# Patient Record
Sex: FEMALE | Race: WHITE | NOT HISPANIC OR LATINO | Employment: OTHER | URBAN - METROPOLITAN AREA
[De-identification: names, ages, dates, MRNs, and addresses within clinical notes are randomized per-mention and may not be internally consistent; named-entity substitution may affect disease eponyms.]

---

## 2020-08-26 ENCOUNTER — SURGERY/PROCEDURE (OUTPATIENT)
Dept: URBAN - METROPOLITAN AREA SURGERY 14 | Facility: SURGERY | Age: 75
End: 2020-08-26

## 2020-08-26 ENCOUNTER — ESTABLISHED PATIENT (OUTPATIENT)
Dept: URBAN - METROPOLITAN AREA CLINIC 39 | Facility: CLINIC | Age: 75
End: 2020-08-26

## 2020-08-26 DIAGNOSIS — H26.492: ICD-10-CM

## 2020-08-26 DIAGNOSIS — H35.30: ICD-10-CM

## 2020-08-26 PROCEDURE — 66821 AFTER CATARACT LASER SURGERY: CPT

## 2020-08-26 PROCEDURE — 92004 COMPRE OPH EXAM NEW PT 1/>: CPT

## 2020-08-26 ASSESSMENT — TONOMETRY
OS_IOP_MMHG: 24
OD_IOP_MMHG: 21

## 2020-08-26 ASSESSMENT — VISUAL ACUITY
OD_SC: 20/60-2
OS_BAT: 20/80

## 2023-03-24 ENCOUNTER — OFFICE VISIT (OUTPATIENT)
Dept: FAMILY MEDICINE CLINIC | Age: 78
End: 2023-03-24
Payer: MEDICARE

## 2023-03-24 VITALS
HEIGHT: 65 IN | OXYGEN SATURATION: 97 % | WEIGHT: 107 LBS | DIASTOLIC BLOOD PRESSURE: 84 MMHG | HEART RATE: 78 BPM | RESPIRATION RATE: 14 BRPM | SYSTOLIC BLOOD PRESSURE: 132 MMHG | TEMPERATURE: 98.2 F | BODY MASS INDEX: 17.83 KG/M2

## 2023-03-24 DIAGNOSIS — H61.21 IMPACTED CERUMEN OF RIGHT EAR: ICD-10-CM

## 2023-03-24 DIAGNOSIS — H66.002 NON-RECURRENT ACUTE SUPPURATIVE OTITIS MEDIA OF LEFT EAR WITHOUT SPONTANEOUS RUPTURE OF TYMPANIC MEMBRANE: Primary | ICD-10-CM

## 2023-03-24 DIAGNOSIS — R21 RASH AND NONSPECIFIC SKIN ERUPTION: ICD-10-CM

## 2023-03-24 PROBLEM — I10 PRIMARY HYPERTENSION: Status: ACTIVE | Noted: 2023-03-24

## 2023-03-24 PROCEDURE — 3079F DIAST BP 80-89 MM HG: CPT

## 2023-03-24 PROCEDURE — 3075F SYST BP GE 130 - 139MM HG: CPT

## 2023-03-24 PROCEDURE — 99204 OFFICE O/P NEW MOD 45 MIN: CPT

## 2023-03-24 PROCEDURE — 1123F ACP DISCUSS/DSCN MKR DOCD: CPT

## 2023-03-24 RX ORDER — AMOXICILLIN AND CLAVULANATE POTASSIUM 875; 125 MG/1; MG/1
1 TABLET, FILM COATED ORAL 2 TIMES DAILY
Qty: 20 TABLET | Refills: 0 | Status: SHIPPED | OUTPATIENT
Start: 2023-03-24 | End: 2023-04-03

## 2023-03-24 RX ORDER — CETIRIZINE HYDROCHLORIDE 5 MG/1
5 TABLET ORAL DAILY
Qty: 30 TABLET | Refills: 0 | Status: SHIPPED | OUTPATIENT
Start: 2023-03-24

## 2023-03-24 RX ORDER — METHYLPREDNISOLONE 4 MG/1
TABLET ORAL
Qty: 1 KIT | Refills: 0 | Status: CANCELLED | OUTPATIENT
Start: 2023-03-24 | End: 2023-03-30

## 2023-03-24 RX ORDER — ATENOLOL 25 MG/1
TABLET ORAL
COMMUNITY
Start: 2023-02-26

## 2023-03-24 RX ORDER — VALSARTAN AND HYDROCHLOROTHIAZIDE 320; 25 MG/1; MG/1
TABLET, FILM COATED ORAL
COMMUNITY
Start: 2023-02-02

## 2023-03-24 SDOH — ECONOMIC STABILITY: FOOD INSECURITY: WITHIN THE PAST 12 MONTHS, THE FOOD YOU BOUGHT JUST DIDN'T LAST AND YOU DIDN'T HAVE MONEY TO GET MORE.: NEVER TRUE

## 2023-03-24 SDOH — ECONOMIC STABILITY: FOOD INSECURITY: WITHIN THE PAST 12 MONTHS, YOU WORRIED THAT YOUR FOOD WOULD RUN OUT BEFORE YOU GOT MONEY TO BUY MORE.: NEVER TRUE

## 2023-03-24 SDOH — ECONOMIC STABILITY: HOUSING INSECURITY
IN THE LAST 12 MONTHS, WAS THERE A TIME WHEN YOU DID NOT HAVE A STEADY PLACE TO SLEEP OR SLEPT IN A SHELTER (INCLUDING NOW)?: NO

## 2023-03-24 SDOH — ECONOMIC STABILITY: INCOME INSECURITY: HOW HARD IS IT FOR YOU TO PAY FOR THE VERY BASICS LIKE FOOD, HOUSING, MEDICAL CARE, AND HEATING?: NOT HARD AT ALL

## 2023-03-24 ASSESSMENT — PATIENT HEALTH QUESTIONNAIRE - PHQ9
SUM OF ALL RESPONSES TO PHQ QUESTIONS 1-9: 0
SUM OF ALL RESPONSES TO PHQ9 QUESTIONS 1 & 2: 0
SUM OF ALL RESPONSES TO PHQ QUESTIONS 1-9: 0
2. FEELING DOWN, DEPRESSED OR HOPELESS: 0
1. LITTLE INTEREST OR PLEASURE IN DOING THINGS: 0

## 2023-03-24 ASSESSMENT — ENCOUNTER SYMPTOMS
SHORTNESS OF BREATH: 0
COUGH: 0
WHEEZING: 0
ROS SKIN COMMENTS: SEE HPI

## 2023-03-24 NOTE — PATIENT INSTRUCTIONS
Antibiotic Instructions: Complete the full course of antibiotics as ordered. Take each dose with a small snack or meal to lessen potential GI upset. To prevent antibiotic resistance, please take medication as ordered and for the full duration even if you start to feel better. Consider intake of yogurt or probiotic during antibiotic use and for a few days after to help reduce the risk of developing a secondary infection. Take the yogurt or probiotic at least 2 hours after taking the antibiotic.

## 2023-03-24 NOTE — PROGRESS NOTES
Occupational History    Not on file   Tobacco Use    Smoking status: Never     Passive exposure: Past    Smokeless tobacco: Former     Quit date: 2022   Vaping Use    Vaping Use: Never used   Substance and Sexual Activity    Alcohol use: Never    Drug use: Never    Sexual activity: Not on file   Other Topics Concern    Not on file   Social History Narrative    Not on file     Social Determinants of Health     Financial Resource Strain: Low Risk     Difficulty of Paying Living Expenses: Not hard at all   Food Insecurity: No Food Insecurity    Worried About [facility] in the Last Year: Never true    [address] [place] in the Last Year: Never true   Transportation Needs: Unknown    Lack of Transportation (Medical): Not on file    Lack of Transportation (Non-Medical): No   Physical Activity: Not on file   Stress: Not on file   Social Connections: Not on file   Intimate Partner Violence: Not on file   Housing Stability: Unknown    Unable to Pay for Housing in the Last Year: Not on file    Number of Places Lived in the Last Year: Not on file    Unstable Housing in the Last Year: No       History reviewed. No pertinent family history. Review of Systems   Constitutional:  Negative for chills, fatigue and fever. HENT:  Positive for ear pain. See HPI   Respiratory:  Negative for cough, shortness of breath and wheezing. Cardiovascular:  Negative for chest pain and palpitations. Musculoskeletal:  Negative for arthralgias and myalgias. Skin:  Positive for rash. See HPI   Allergic/Immunologic: Negative for environmental allergies and food allergies. OBJECTIVE:  VITALS:  /84 (Site: Right Upper Arm, Position: Sitting, Cuff Size: Medium Adult)   Pulse 78   Temp 98.2 °F (36.8 °C) (Temporal)   Resp 14   Ht 5' 5\" (1.651 m)   Wt 107 lb (48.5 kg)   SpO2 97%   BMI 17.81 kg/m²     Physical Exam  Vitals reviewed. Constitutional:       General: She is not in acute distress.

## 2023-09-14 ENCOUNTER — OFFICE VISIT (OUTPATIENT)
Dept: FAMILY MEDICINE CLINIC | Age: 78
End: 2023-09-14
Payer: MEDICARE

## 2023-09-14 VITALS
SYSTOLIC BLOOD PRESSURE: 138 MMHG | OXYGEN SATURATION: 97 % | DIASTOLIC BLOOD PRESSURE: 78 MMHG | HEART RATE: 65 BPM | WEIGHT: 108.6 LBS | HEIGHT: 65 IN | BODY MASS INDEX: 18.09 KG/M2

## 2023-09-14 DIAGNOSIS — S90.212A SUBUNGUAL HEMATOMA OF GREAT TOE OF LEFT FOOT, INITIAL ENCOUNTER: Primary | ICD-10-CM

## 2023-09-14 PROCEDURE — 3078F DIAST BP <80 MM HG: CPT | Performed by: NURSE PRACTITIONER

## 2023-09-14 PROCEDURE — 99203 OFFICE O/P NEW LOW 30 MIN: CPT | Performed by: NURSE PRACTITIONER

## 2023-09-14 PROCEDURE — 3075F SYST BP GE 130 - 139MM HG: CPT | Performed by: NURSE PRACTITIONER

## 2023-09-14 PROCEDURE — 1123F ACP DISCUSS/DSCN MKR DOCD: CPT | Performed by: NURSE PRACTITIONER

## 2023-09-14 RX ORDER — ALPRAZOLAM 0.5 MG/1
TABLET ORAL
COMMUNITY
Start: 2023-08-15

## 2023-09-14 RX ORDER — ASPIRIN 81 MG/1
81 TABLET ORAL DAILY
COMMUNITY
Start: 2023-06-02

## 2023-09-14 ASSESSMENT — ENCOUNTER SYMPTOMS
CHEST TIGHTNESS: 0
DIARRHEA: 0
NAUSEA: 0
COUGH: 0
VOMITING: 0
SHORTNESS OF BREATH: 0

## 2023-09-14 NOTE — PROGRESS NOTES
Subjective  Jerome Flynn, 78 y.o. female presents today with:  Chief Complaint   Patient presents with    Toe Injury     Onset- 3 weeks stubbed toe   She states went on a walk yesterday and noticed some blood. Location- LT big toe  ROM- well   Pain- Y  Bruised- Y  Swollen- N  Red- N   Warmth- N   Treatments- none        I have reviewed staff HPI/chief complaint do agree with above. Patient stubbed her toe on a brick step leading into her outdoor deck approximately 3 weeks ago. Has noticed since this time bruising underneath the left great toenail that has slightly spread into the left toe medially from the nail bed region. There is mild pain with wearing shoes otherwise denies any significant pain/discomfort with ambulation or while wearing open toed shoes. Denies any significant redness, warmth to the area. She has noticed spontaneous drainage/bleeding from the distal region of the great toenail. Denies any loss of range of motion. Denies any use of over-the-counter medications at this time          Review of Systems   Constitutional:  Negative for chills, fatigue and fever. Respiratory:  Negative for cough, chest tightness and shortness of breath. Cardiovascular:  Negative for chest pain, palpitations and leg swelling. Gastrointestinal:  Negative for diarrhea, nausea and vomiting. Musculoskeletal:  Negative for joint swelling. Bruising to left great toe. Skin:  Negative for rash. Neurological:  Negative for dizziness, weakness, light-headedness, numbness and headaches. Psychiatric/Behavioral:  Negative for behavioral problems.         Past Medical History:   Diagnosis Date    Lung cancer ([address]) 01/2022 03/2022 two tumors removed from lungs, stage 1, no complications since     Past Surgical History:   Procedure Laterality Date    CORONARY ARTERY BYPASS GRAFT  2017    EAR SURGERY Left 2020    repair of ruptured TM    SUBTOTAL COLECTOMY  2020     Social History     Socioeconomic

## 2023-10-01 ENCOUNTER — APPOINTMENT (OUTPATIENT)
Dept: CT IMAGING | Age: 78
DRG: 640 | End: 2023-10-01
Payer: MEDICARE

## 2023-10-01 ENCOUNTER — HOSPITAL ENCOUNTER (INPATIENT)
Age: 78
LOS: 1 days | Discharge: HOME | DRG: 640 | End: 2023-10-02
Attending: FAMILY MEDICINE | Admitting: INTERNAL MEDICINE
Payer: MEDICARE

## 2023-10-01 ENCOUNTER — APPOINTMENT (OUTPATIENT)
Dept: GENERAL RADIOLOGY | Age: 78
DRG: 640 | End: 2023-10-01
Payer: MEDICARE

## 2023-10-01 DIAGNOSIS — E87.6 HYPOKALEMIA: ICD-10-CM

## 2023-10-01 DIAGNOSIS — E83.51 HYPOCALCEMIA: Primary | ICD-10-CM

## 2023-10-01 DIAGNOSIS — U07.1 COVID-19 VIRUS INFECTION: ICD-10-CM

## 2023-10-01 DIAGNOSIS — R53.1 GENERAL WEAKNESS: ICD-10-CM

## 2023-10-01 DIAGNOSIS — R19.7 DIARRHEA, UNSPECIFIED TYPE: ICD-10-CM

## 2023-10-01 LAB
ALBUMIN SERPL-MCNC: 2.2 G/DL (ref 3.5–4.6)
ALP SERPL-CCNC: 64 U/L (ref 40–130)
ALT SERPL-CCNC: 11 U/L (ref 0–33)
ANION GAP SERPL CALCULATED.3IONS-SCNC: 14 MEQ/L (ref 9–15)
AST SERPL-CCNC: 18 U/L (ref 0–35)
B PARAP IS1001 DNA NPH QL NAA+NON-PROBE: NOT DETECTED
B PERT.PT PRMT NPH QL NAA+NON-PROBE: NOT DETECTED
BASOPHILS # BLD: 0 K/UL (ref 0–0.2)
BASOPHILS NFR BLD: 0.3 %
BILIRUB SERPL-MCNC: <0.2 MG/DL (ref 0.2–0.7)
BNP BLD-MCNC: 475 PG/ML
BUN SERPL-MCNC: 18 MG/DL (ref 8–23)
C PNEUM DNA NPH QL NAA+NON-PROBE: NOT DETECTED
CALCIUM SERPL-MCNC: 5 MG/DL (ref 8.5–9.9)
CHLORIDE SERPL-SCNC: 108 MEQ/L (ref 95–107)
CO2 SERPL-SCNC: 15 MEQ/L (ref 20–31)
CREAT SERPL-MCNC: 0.61 MG/DL (ref 0.5–0.9)
EOSINOPHIL # BLD: 0 K/UL (ref 0–0.7)
EOSINOPHIL NFR BLD: 0 %
ERYTHROCYTE [DISTWIDTH] IN BLOOD BY AUTOMATED COUNT: 12 % (ref 11.5–14.5)
FLUAV RNA NPH QL NAA+NON-PROBE: NOT DETECTED
FLUBV RNA NPH QL NAA+NON-PROBE: NOT DETECTED
GLOBULIN SER CALC-MCNC: 2.2 G/DL (ref 2.3–3.5)
GLUCOSE SERPL-MCNC: 60 MG/DL (ref 70–99)
HADV DNA NPH QL NAA+NON-PROBE: NOT DETECTED
HCOV 229E RNA NPH QL NAA+NON-PROBE: NOT DETECTED
HCOV HKU1 RNA NPH QL NAA+NON-PROBE: NOT DETECTED
HCOV NL63 RNA NPH QL NAA+NON-PROBE: NOT DETECTED
HCOV OC43 RNA NPH QL NAA+NON-PROBE: NOT DETECTED
HCT VFR BLD AUTO: 42.9 % (ref 37–47)
HGB BLD-MCNC: 14.5 G/DL (ref 12–16)
HMPV RNA NPH QL NAA+NON-PROBE: NOT DETECTED
HPIV1 RNA NPH QL NAA+NON-PROBE: NOT DETECTED
HPIV2 RNA NPH QL NAA+NON-PROBE: NOT DETECTED
HPIV3 RNA NPH QL NAA+NON-PROBE: NOT DETECTED
HPIV4 RNA NPH QL NAA+NON-PROBE: NOT DETECTED
LYMPHOCYTES # BLD: 1.1 K/UL (ref 1–4.8)
LYMPHOCYTES NFR BLD: 18.2 %
M PNEUMO DNA NPH QL NAA+NON-PROBE: NOT DETECTED
MAGNESIUM SERPL-MCNC: 1 MG/DL (ref 1.7–2.4)
MAGNESIUM SERPL-MCNC: 1.7 MG/DL (ref 1.7–2.4)
MCH RBC QN AUTO: 29.5 PG (ref 27–31.3)
MCHC RBC AUTO-ENTMCNC: 33.8 % (ref 33–37)
MCV RBC AUTO: 87.2 FL (ref 79.4–94.8)
MONOCYTES # BLD: 0.5 K/UL (ref 0.2–0.8)
MONOCYTES NFR BLD: 9.3 %
NEUTROPHILS # BLD: 4.2 K/UL (ref 1.4–6.5)
NEUTS SEG NFR BLD: 71.9 %
PLATELET # BLD AUTO: 276 K/UL (ref 130–400)
POTASSIUM SERPL-SCNC: 2 MEQ/L (ref 3.4–4.9)
PROT SERPL-MCNC: 4.4 G/DL (ref 6.3–8)
RBC # BLD AUTO: 4.92 M/UL (ref 4.2–5.4)
RSV RNA NPH QL NAA+NON-PROBE: NOT DETECTED
RV+EV RNA NPH QL NAA+NON-PROBE: NOT DETECTED
SARS-COV-2 RNA NPH QL NAA+NON-PROBE: DETECTED
SODIUM SERPL-SCNC: 137 MEQ/L (ref 135–144)
TROPONIN T SERPL-MCNC: <0.01 NG/ML (ref 0–0.01)
WBC # BLD AUTO: 5.8 K/UL (ref 4.8–10.8)

## 2023-10-01 PROCEDURE — 73080 X-RAY EXAM OF ELBOW: CPT

## 2023-10-01 PROCEDURE — 6830039000 HC L3 TRAUMA ALERT

## 2023-10-01 PROCEDURE — 70450 CT HEAD/BRAIN W/O DYE: CPT

## 2023-10-01 PROCEDURE — 1210000000 HC MED SURG R&B

## 2023-10-01 PROCEDURE — 2580000003 HC RX 258

## 2023-10-01 PROCEDURE — 99285 EMERGENCY DEPT VISIT HI MDM: CPT

## 2023-10-01 PROCEDURE — 0HQ1XZZ REPAIR FACE SKIN, EXTERNAL APPROACH: ICD-10-PCS | Performed by: FAMILY MEDICINE

## 2023-10-01 PROCEDURE — 72125 CT NECK SPINE W/O DYE: CPT

## 2023-10-01 PROCEDURE — 12013 RPR F/E/E/N/L/M 2.6-5.0 CM: CPT

## 2023-10-01 PROCEDURE — 6370000000 HC RX 637 (ALT 250 FOR IP): Performed by: FAMILY MEDICINE

## 2023-10-01 PROCEDURE — 6360000002 HC RX W HCPCS: Performed by: FAMILY MEDICINE

## 2023-10-01 PROCEDURE — 2580000003 HC RX 258: Performed by: FAMILY MEDICINE

## 2023-10-01 RX ORDER — POLYETHYLENE GLYCOL 3350 17 G/17G
17 POWDER, FOR SOLUTION ORAL DAILY PRN
Status: DISCONTINUED | OUTPATIENT
Start: 2023-10-01 | End: 2023-10-02 | Stop reason: HOSPADM

## 2023-10-01 RX ORDER — ONDANSETRON 2 MG/ML
4 INJECTION INTRAMUSCULAR; INTRAVENOUS EVERY 6 HOURS PRN
Status: DISCONTINUED | OUTPATIENT
Start: 2023-10-01 | End: 2023-10-02 | Stop reason: HOSPADM

## 2023-10-01 RX ORDER — POTASSIUM CHLORIDE 7.45 MG/ML
10 INJECTION INTRAVENOUS
Status: DISPENSED | OUTPATIENT
Start: 2023-10-02 | End: 2023-10-02

## 2023-10-01 RX ORDER — MAGNESIUM SULFATE IN WATER 40 MG/ML
2000 INJECTION, SOLUTION INTRAVENOUS ONCE
Status: COMPLETED | OUTPATIENT
Start: 2023-10-01 | End: 2023-10-02

## 2023-10-01 RX ORDER — ENOXAPARIN SODIUM 100 MG/ML
30 INJECTION SUBCUTANEOUS DAILY
Status: DISCONTINUED | OUTPATIENT
Start: 2023-10-02 | End: 2023-10-02 | Stop reason: HOSPADM

## 2023-10-01 RX ORDER — SODIUM CHLORIDE 0.9 % (FLUSH) 0.9 %
5-40 SYRINGE (ML) INJECTION EVERY 12 HOURS SCHEDULED
Status: DISCONTINUED | OUTPATIENT
Start: 2023-10-01 | End: 2023-10-02 | Stop reason: HOSPADM

## 2023-10-01 RX ORDER — ACETAMINOPHEN 650 MG/1
650 SUPPOSITORY RECTAL EVERY 6 HOURS PRN
Status: DISCONTINUED | OUTPATIENT
Start: 2023-10-01 | End: 2023-10-02 | Stop reason: HOSPADM

## 2023-10-01 RX ORDER — SODIUM CHLORIDE 9 MG/ML
INJECTION, SOLUTION INTRAVENOUS
Status: COMPLETED
Start: 2023-10-01 | End: 2023-10-02

## 2023-10-01 RX ORDER — SODIUM CHLORIDE 0.9 % (FLUSH) 0.9 %
5-40 SYRINGE (ML) INJECTION PRN
Status: DISCONTINUED | OUTPATIENT
Start: 2023-10-01 | End: 2023-10-02 | Stop reason: HOSPADM

## 2023-10-01 RX ORDER — LOPERAMIDE HYDROCHLORIDE 2 MG/1
2 CAPSULE ORAL 4 TIMES DAILY PRN
Status: DISCONTINUED | OUTPATIENT
Start: 2023-10-01 | End: 2023-10-02 | Stop reason: HOSPADM

## 2023-10-01 RX ORDER — ACETAMINOPHEN 325 MG/1
650 TABLET ORAL EVERY 6 HOURS PRN
Status: DISCONTINUED | OUTPATIENT
Start: 2023-10-01 | End: 2023-10-02 | Stop reason: HOSPADM

## 2023-10-01 RX ORDER — SODIUM CHLORIDE 9 MG/ML
INJECTION, SOLUTION INTRAVENOUS PRN
Status: DISCONTINUED | OUTPATIENT
Start: 2023-10-01 | End: 2023-10-02 | Stop reason: HOSPADM

## 2023-10-01 RX ORDER — SODIUM CHLORIDE 9 MG/ML
INJECTION, SOLUTION INTRAVENOUS CONTINUOUS
Status: DISCONTINUED | OUTPATIENT
Start: 2023-10-01 | End: 2023-10-02 | Stop reason: HOSPADM

## 2023-10-01 RX ORDER — ONDANSETRON 4 MG/1
4 TABLET, ORALLY DISINTEGRATING ORAL EVERY 8 HOURS PRN
Status: DISCONTINUED | OUTPATIENT
Start: 2023-10-01 | End: 2023-10-02 | Stop reason: HOSPADM

## 2023-10-01 RX ADMIN — CALCIUM GLUCONATE 1000 MG: 98 INJECTION, SOLUTION INTRAVENOUS at 21:52

## 2023-10-01 RX ADMIN — POTASSIUM BICARBONATE 50 MEQ: 978 TABLET, EFFERVESCENT ORAL at 21:41

## 2023-10-01 RX ADMIN — SODIUM CHLORIDE 1000 ML: 9 INJECTION, SOLUTION INTRAVENOUS at 23:14

## 2023-10-01 ASSESSMENT — PAIN - FUNCTIONAL ASSESSMENT
PAIN_FUNCTIONAL_ASSESSMENT: 0-10
PAIN_FUNCTIONAL_ASSESSMENT: ACTIVITIES ARE NOT PREVENTED

## 2023-10-01 ASSESSMENT — PAIN SCALES - GENERAL
PAINLEVEL_OUTOF10: 5
PAINLEVEL_OUTOF10: 7

## 2023-10-01 ASSESSMENT — LIFESTYLE VARIABLES
HOW OFTEN DO YOU HAVE A DRINK CONTAINING ALCOHOL: 4 OR MORE TIMES A WEEK
HOW OFTEN DO YOU HAVE A DRINK CONTAINING ALCOHOL: NEVER
HOW MANY STANDARD DRINKS CONTAINING ALCOHOL DO YOU HAVE ON A TYPICAL DAY: 1 OR 2
HOW MANY STANDARD DRINKS CONTAINING ALCOHOL DO YOU HAVE ON A TYPICAL DAY: PATIENT DOES NOT DRINK

## 2023-10-01 ASSESSMENT — PAIN DESCRIPTION - LOCATION
LOCATION: THROAT
LOCATION: HEAD

## 2023-10-01 ASSESSMENT — ENCOUNTER SYMPTOMS
RESPIRATORY NEGATIVE: 1
DIARRHEA: 1
ALLERGIC/IMMUNOLOGIC NEGATIVE: 1
EYES NEGATIVE: 1

## 2023-10-01 ASSESSMENT — PAIN DESCRIPTION - DESCRIPTORS
DESCRIPTORS: ACHING;THROBBING
DESCRIPTORS: BURNING;SORE

## 2023-10-01 ASSESSMENT — PAIN DESCRIPTION - PAIN TYPE
TYPE: ACUTE PAIN
TYPE: ACUTE PAIN

## 2023-10-01 ASSESSMENT — PAIN DESCRIPTION - ONSET: ONSET: ON-GOING

## 2023-10-01 ASSESSMENT — PAIN DESCRIPTION - ORIENTATION: ORIENTATION: ANTERIOR;UPPER

## 2023-10-01 ASSESSMENT — PAIN DESCRIPTION - FREQUENCY: FREQUENCY: CONTINUOUS

## 2023-10-02 VITALS
HEIGHT: 65 IN | OXYGEN SATURATION: 99 % | TEMPERATURE: 98.2 F | SYSTOLIC BLOOD PRESSURE: 99 MMHG | RESPIRATION RATE: 18 BRPM | BODY MASS INDEX: 18.31 KG/M2 | WEIGHT: 109.9 LBS | DIASTOLIC BLOOD PRESSURE: 56 MMHG | HEART RATE: 75 BPM

## 2023-10-02 PROBLEM — R53.1 GENERAL WEAKNESS: Status: RESOLVED | Noted: 2023-10-01 | Resolved: 2023-10-02

## 2023-10-02 LAB
ALBUMIN SERPL-MCNC: 3.5 G/DL (ref 3.5–4.6)
ALP SERPL-CCNC: 94 U/L (ref 40–130)
ALT SERPL-CCNC: 16 U/L (ref 0–33)
ANION GAP SERPL CALCULATED.3IONS-SCNC: 18 MEQ/L (ref 9–15)
AST SERPL-CCNC: 30 U/L (ref 0–35)
BASOPHILS # BLD: 0 K/UL (ref 0–0.2)
BASOPHILS NFR BLD: 0.4 %
BILIRUB SERPL-MCNC: <0.2 MG/DL (ref 0.2–0.7)
BUN SERPL-MCNC: 23 MG/DL (ref 8–23)
CALCIUM SERPL-MCNC: 8.4 MG/DL (ref 8.5–9.9)
CHLORIDE SERPL-SCNC: 95 MEQ/L (ref 95–107)
CO2 SERPL-SCNC: 21 MEQ/L (ref 20–31)
CREAT SERPL-MCNC: 0.89 MG/DL (ref 0.5–0.9)
EOSINOPHIL # BLD: 0 K/UL (ref 0–0.7)
EOSINOPHIL NFR BLD: 0 %
ERYTHROCYTE [DISTWIDTH] IN BLOOD BY AUTOMATED COUNT: 12.2 % (ref 11.5–14.5)
GLOBULIN SER CALC-MCNC: 3.2 G/DL (ref 2.3–3.5)
GLUCOSE SERPL-MCNC: 72 MG/DL (ref 70–99)
HCT VFR BLD AUTO: 40.6 % (ref 37–47)
HGB BLD-MCNC: 13.7 G/DL (ref 12–16)
LYMPHOCYTES # BLD: 1.6 K/UL (ref 1–4.8)
LYMPHOCYTES NFR BLD: 30.2 %
MCH RBC QN AUTO: 29.9 PG (ref 27–31.3)
MCHC RBC AUTO-ENTMCNC: 33.7 % (ref 33–37)
MCV RBC AUTO: 88.6 FL (ref 79.4–94.8)
MONOCYTES # BLD: 0.7 K/UL (ref 0.2–0.8)
MONOCYTES NFR BLD: 13.8 %
NEUTROPHILS # BLD: 2.8 K/UL (ref 1.4–6.5)
NEUTS SEG NFR BLD: 55.4 %
PLATELET # BLD AUTO: 273 K/UL (ref 130–400)
POTASSIUM SERPL-SCNC: 3.9 MEQ/L (ref 3.4–4.9)
PROT SERPL-MCNC: 6.7 G/DL (ref 6.3–8)
RBC # BLD AUTO: 4.58 M/UL (ref 4.2–5.4)
SODIUM SERPL-SCNC: 134 MEQ/L (ref 135–144)
WBC # BLD AUTO: 5.1 K/UL (ref 4.8–10.8)

## 2023-10-02 PROCEDURE — 6360000002 HC RX W HCPCS: Performed by: INTERNAL MEDICINE

## 2023-10-02 PROCEDURE — 97161 PT EVAL LOW COMPLEX 20 MIN: CPT

## 2023-10-02 PROCEDURE — 2580000003 HC RX 258: Performed by: INTERNAL MEDICINE

## 2023-10-02 PROCEDURE — 6370000000 HC RX 637 (ALT 250 FOR IP): Performed by: INTERNAL MEDICINE

## 2023-10-02 PROCEDURE — 80053 COMPREHEN METABOLIC PANEL: CPT

## 2023-10-02 PROCEDURE — 85025 COMPLETE CBC W/AUTO DIFF WBC: CPT

## 2023-10-02 PROCEDURE — 36415 COLL VENOUS BLD VENIPUNCTURE: CPT

## 2023-10-02 RX ADMIN — MAGNESIUM SULFATE HEPTAHYDRATE 2000 MG: 40 INJECTION, SOLUTION INTRAVENOUS at 01:09

## 2023-10-02 RX ADMIN — SODIUM CHLORIDE, PRESERVATIVE FREE 10 ML: 5 INJECTION INTRAVENOUS at 08:44

## 2023-10-02 RX ADMIN — BENZOCAINE 6 MG-MENTHOL 10 MG LOZENGES 1 LOZENGE: at 01:56

## 2023-10-02 RX ADMIN — POTASSIUM CHLORIDE 10 MEQ: 7.46 INJECTION, SOLUTION INTRAVENOUS at 04:39

## 2023-10-02 RX ADMIN — POTASSIUM CHLORIDE 10 MEQ: 7.46 INJECTION, SOLUTION INTRAVENOUS at 06:36

## 2023-10-02 RX ADMIN — LOPERAMIDE HYDROCHLORIDE 2 MG: 2 CAPSULE ORAL at 01:09

## 2023-10-02 RX ADMIN — ENOXAPARIN SODIUM 30 MG: 100 INJECTION SUBCUTANEOUS at 08:44

## 2023-10-02 RX ADMIN — POTASSIUM CHLORIDE 10 MEQ: 7.46 INJECTION, SOLUTION INTRAVENOUS at 02:56

## 2023-10-02 RX ADMIN — POTASSIUM CHLORIDE 10 MEQ: 7.46 INJECTION, SOLUTION INTRAVENOUS at 02:00

## 2023-10-02 RX ADMIN — CALCIUM GLUCONATE 1000 MG: 98 INJECTION, SOLUTION INTRAVENOUS at 08:45

## 2023-10-02 ASSESSMENT — PAIN SCALES - GENERAL
PAINLEVEL_OUTOF10: 0
PAINLEVEL_OUTOF10: 3

## 2023-10-02 ASSESSMENT — PAIN DESCRIPTION - LOCATION: LOCATION: THROAT

## 2023-10-02 NOTE — DISCHARGE INSTRUCTIONS
Follow up with primary care physician in the next 7 days or sooner if needed. Please have your PCP recheck your electrolytes via a BMP. Please return to ER or call 911 if you develop any significant signs or symptoms. I may not have addressed all of your medical illnesses or the abnormal blood work or imaging therefore please ask your PCP to obtain [facility] record to follow up on all of the abnormal labs, imaging and findings that I have and have not addressed during your hospitalization. Discharging you from the hospital does not mean that your medical care ends here and now. You may still need additional work up, investigation, monitoring, and treatment to be handled from this point on by outside providers including your PCP, Specialists and other healthcare providers. For medication questions, contact your retail pharmacy and your PCP. Your medical team at [facility] ([facility]) appreciates the opportunity to work with you to get well!     Cameron Ansari MD  1:04 PM

## 2023-10-02 NOTE — DISCHARGE INSTR - DIET

## 2023-10-02 NOTE — FLOWSHEET NOTE
Potassium replacement running at 50ml Patient is c/o of burning at side. Patient refusing last does of IV dose. K+ level at this time is 3.9 Patient c/o bilateral hearing loss that started 2 days ago. BP in left arm reading 179/11 and right arm reading 99/56 Will notify MD of findings.

## 2023-10-02 NOTE — ED PROVIDER NOTES
METABOLIC PANEL W/ REFLEX TO MG FOR LOW K - Abnormal; Notable for the following components:    Potassium reflex Magnesium 2.0 (*)     Chloride 108 (*)     CO2 15 (*)     Glucose 60 (*)     Calcium 5.0 (*)     Total Protein 4.4 (*)     Albumin 2.2 (*)     Globulin 2.2 (*)     All other components within normal limits    Narrative:     Hernando Oconnor  [facility] tel. [phone],  ca kx results called to and read back by dr Case Rodríguez, 10/01/2023 19:56, by [address] [place] [address] - Abnormal; Notable for the following components:    Magnesium 1.0 (*)     All other components within normal limits    Narrative:     Hernando Bermudezus  [facility] tel. [phone],  mg results called to and read back by rupert, 10/01/2023 20:37, by Javier Syed  ca kx results called to and read back by dr Case Rodríguez, 10/01/2023 19:56, by [address] [place] [id number], INTACT       All other labs were within normal range or not returned as of this dictation. [employer]    Date/Time: 10/1/2023 9:57 PM    Performed by: Asaf Mario MD  Authorized by:  Dl Mitchell MD    Consent:     Consent obtained:  Verbal    Consent given by:  Patient    Risks discussed:  Infection  Universal protocol:     Procedure explained and questions answered to patient or proxy's satisfaction: yes      Relevant documents present and verified: yes      Test results available: yes      Imaging studies available: yes      Site/side marked: yes      Immediately prior to procedure, a time out was called: yes      Patient identity confirmed:  Verbally with patient  Laceration details:     Location:  Face    Face location:  Forehead    Length (cm):  3    Depth (mm):  1  Exploration:     Limited defect created (wound extended): no      Hemostasis achieved with:  Direct pressure  Treatment:     Area cleansed with:  Saline    Amount of cleaning:  Standard    Irrigation method:  Syringe    Debridement:  None    Undermining:  None  Skin repair:     Repair

## 2023-10-02 NOTE — PROGRESS NOTES
Goals  Short Term Goal 1: none indicated  Long Term Goals  Long Term Goal 1: none indicated    AMPAC (6 CLICK) BASIC MOBILITY  AM-PAC Inpatient Mobility Raw Score : 24     Therapy Time:   Individual   Time In 0936   Time Out 0945   Minutes 9       Eval x 9 min    Daija Pulido PT, 10/02/23 at 10:40 AM         Definitions for assistance levels  Independent = pt does not require any physical supervision or assistance from another person for activity completion. Device may be needed.   Stand by assistance = pt requires verbal cues or instructions from another person, close to but not touching, to perform the activity  Minimal assistance= pt performs 75% or more of the activity; assistance is required to complete the activity  Moderate assistance= pt performs 50% of the activity; assistance is required to complete the activity  Maximal assistance = pt performs 25% of the activity; assistance is required to complete the activity  Dependent = pt requires total physical assistance to accomplish the task

## 2023-10-02 NOTE — DISCHARGE INSTR - COC
{Esignature:904265171}    CASE MANAGEMENT/SOCIAL WORK SECTION    Inpatient Status Date: ***    Readmission Risk Assessment Score:  Readmission Risk              Risk of Unplanned Readmission:  11           Discharging to Facility/ Agency   Name:   Address:  Phone:  Fax:    Dialysis Facility (if applicable)   Name:  Address:  Dialysis Schedule:  Phone:  Fax:    / signature: {Esignature:327820601}    PHYSICIAN SECTION    Prognosis: {Prognosis:0386698365}    Condition at Discharge: [address] Patient Condition:927096912}    Rehab Potential (if transferring to Rehab): {Prognosis:3092212914}    Recommended Labs or Other Treatments After Discharge: ***    Physician Certification: I certify the above information and transfer of Charley Osorio  is necessary for the continuing treatment of the diagnosis listed and that she requires {Admit to Appropriate Level of Care:75577} for {GREATER/LESS:693712805} 30 days.      Update Admission H&P: {CHP DME Changes in XKNYQ:915485133}    PHYSICIAN SIGNATURE:  {Esignature:790872541}

## 2023-10-02 NOTE — PLAN OF CARE
Problem: Pain  Goal: Verbalizes/displays adequate comfort level or baseline comfort level  Outcome: Progressing  Flowsheets (Taken 10/1/2023 2323 by Samia Hawkins)  Verbalizes/displays adequate comfort level or baseline comfort level:   Encourage patient to monitor pain and request assistance   Assess pain using appropriate pain scale   Administer analgesics based on type and severity of pain and evaluate response   Implement non-pharmacological measures as appropriate and evaluate response   Notify Licensed Independent Practitioner if interventions unsuccessful or patient reports new pain     Problem: Safety - Adult  Goal: Free from fall injury  Outcome: Progressing

## 2023-10-02 NOTE — ED NOTES
Report given to RN taking over care of pt going to room [address]. No acute distress noted. VSS  Telepack applied.      Priscila Cano RN  10/01/23 [id number]

## 2023-10-02 NOTE — H&P
imaging, previous history, records, and talking to patient/family. It also includes time documenting, ordering tests and initiating treatment. Electronically signed by Senthil Aponte MD on 10/1/2023 at 11:22 PM      NOTE: This report was transcribed using voice recognition software. Every effort was made to ensure accuracy; however, inadvertent computerized transcription errors may be present.

## 2023-10-06 LAB
EKG ATRIAL RATE: 75 BPM
EKG P AXIS: 86 DEGREES
EKG P-R INTERVAL: 170 MS
EKG Q-T INTERVAL: 462 MS
EKG QRS DURATION: 70 MS
EKG QTC CALCULATION (BAZETT): 515 MS
EKG R AXIS: -53 DEGREES
EKG T AXIS: -63 DEGREES
EKG VENTRICULAR RATE: 75 BPM
MISCELLANEOUS LAB TEST ORDER: NORMAL
WHOPPER PROMPT: NORMAL